# Patient Record
Sex: MALE
[De-identification: names, ages, dates, MRNs, and addresses within clinical notes are randomized per-mention and may not be internally consistent; named-entity substitution may affect disease eponyms.]

---

## 2018-04-25 ENCOUNTER — HOSPITAL ENCOUNTER (INPATIENT)
Dept: HOSPITAL 5 - ED | Age: 38
LOS: 1 days | Discharge: HOME | DRG: 872 | End: 2018-04-26
Attending: INTERNAL MEDICINE | Admitting: INTERNAL MEDICINE
Payer: COMMERCIAL

## 2018-04-25 DIAGNOSIS — L40.9: ICD-10-CM

## 2018-04-25 DIAGNOSIS — I10: ICD-10-CM

## 2018-04-25 DIAGNOSIS — N26.1: ICD-10-CM

## 2018-04-25 DIAGNOSIS — E87.1: ICD-10-CM

## 2018-04-25 DIAGNOSIS — A41.9: Primary | ICD-10-CM

## 2018-04-25 DIAGNOSIS — Q63.2: ICD-10-CM

## 2018-04-25 DIAGNOSIS — A09: ICD-10-CM

## 2018-04-25 DIAGNOSIS — E86.0: ICD-10-CM

## 2018-04-25 DIAGNOSIS — K29.00: ICD-10-CM

## 2018-04-25 LAB
ALBUMIN SERPL-MCNC: 4.1 G/DL (ref 3.9–5)
ALT SERPL-CCNC: 21 UNITS/L (ref 7–56)
APTT BLD: 33.7 SEC. (ref 24.2–36.6)
BASOPHILS # (AUTO): 0 K/MM3 (ref 0–0.1)
BASOPHILS NFR BLD AUTO: 0.3 % (ref 0–1.8)
BILIRUB UR QL STRIP: (no result)
BLOOD UR QL VISUAL: (no result)
BUN SERPL-MCNC: 14 MG/DL (ref 9–20)
BUN/CREAT SERPL: 14 %
CALCIUM SERPL-MCNC: 8.9 MG/DL (ref 8.4–10.2)
EOSINOPHIL # BLD AUTO: 0 K/MM3 (ref 0–0.4)
EOSINOPHIL NFR BLD AUTO: 0 % (ref 0–4.3)
HCT VFR BLD CALC: 43.5 % (ref 35.5–45.6)
HEMOLYSIS INDEX: 5
HGB BLD-MCNC: 14.9 GM/DL (ref 11.8–15.2)
INR PPP: 1 (ref 0.87–1.13)
LIPASE SERPL-CCNC: 23 UNITS/L (ref 13–60)
LYMPHOCYTES # BLD AUTO: 1.3 K/MM3 (ref 1.2–5.4)
LYMPHOCYTES NFR BLD AUTO: 7.6 % (ref 13.4–35)
MCH RBC QN AUTO: 31 PG (ref 28–32)
MCHC RBC AUTO-ENTMCNC: 34 % (ref 32–34)
MCV RBC AUTO: 91 FL (ref 84–94)
MONOCYTES # (AUTO): 1.7 K/MM3 (ref 0–0.8)
MONOCYTES % (AUTO): 10.4 % (ref 0–7.3)
MUCOUS THREADS #/AREA URNS HPF: (no result) /HPF
PH UR STRIP: 5 [PH] (ref 5–7)
PLATELET # BLD: 188 K/MM3 (ref 140–440)
RBC # BLD AUTO: 4.77 M/MM3 (ref 3.65–5.03)
RBC #/AREA URNS HPF: 4 /HPF (ref 0–6)
UROBILINOGEN UR-MCNC: < 2 MG/DL (ref ?–2)
WBC #/AREA URNS HPF: 3 /HPF (ref 0–6)

## 2018-04-25 PROCEDURE — 96375 TX/PRO/DX INJ NEW DRUG ADDON: CPT

## 2018-04-25 PROCEDURE — 85007 BL SMEAR W/DIFF WBC COUNT: CPT

## 2018-04-25 PROCEDURE — 85025 COMPLETE CBC W/AUTO DIFF WBC: CPT

## 2018-04-25 PROCEDURE — 80048 BASIC METABOLIC PNL TOTAL CA: CPT

## 2018-04-25 PROCEDURE — 71045 X-RAY EXAM CHEST 1 VIEW: CPT

## 2018-04-25 PROCEDURE — 87086 URINE CULTURE/COLONY COUNT: CPT

## 2018-04-25 PROCEDURE — 85610 PROTHROMBIN TIME: CPT

## 2018-04-25 PROCEDURE — 87040 BLOOD CULTURE FOR BACTERIA: CPT

## 2018-04-25 PROCEDURE — 96361 HYDRATE IV INFUSION ADD-ON: CPT

## 2018-04-25 PROCEDURE — 81001 URINALYSIS AUTO W/SCOPE: CPT

## 2018-04-25 PROCEDURE — 83690 ASSAY OF LIPASE: CPT

## 2018-04-25 PROCEDURE — 80053 COMPREHEN METABOLIC PANEL: CPT

## 2018-04-25 PROCEDURE — 87045 FECES CULTURE AEROBIC BACT: CPT

## 2018-04-25 PROCEDURE — 74177 CT ABD & PELVIS W/CONTRAST: CPT

## 2018-04-25 PROCEDURE — 82140 ASSAY OF AMMONIA: CPT

## 2018-04-25 PROCEDURE — 96374 THER/PROPH/DIAG INJ IV PUSH: CPT

## 2018-04-25 PROCEDURE — 36415 COLL VENOUS BLD VENIPUNCTURE: CPT

## 2018-04-25 PROCEDURE — 85730 THROMBOPLASTIN TIME PARTIAL: CPT

## 2018-04-25 PROCEDURE — 87324 CLOSTRIDIUM AG IA: CPT

## 2018-04-25 RX ADMIN — Medication SCH ML: at 21:38

## 2018-04-25 RX ADMIN — ONDANSETRON PRN MG: 2 INJECTION INTRAMUSCULAR; INTRAVENOUS at 15:21

## 2018-04-25 RX ADMIN — ONDANSETRON PRN MG: 2 INJECTION INTRAMUSCULAR; INTRAVENOUS at 23:41

## 2018-04-25 NOTE — CAT SCAN REPORT
CT ABDOMEN PELVIS WITH CONTRAST:



HISTORY:  Fever, abdominal discomfort, diarrhea.



COMPARISON: none.



TECHNIQUE:  Helical CT in 1.25mm intervals following IV contrast. 

Sagittal and coronal reconstructions. 





FINDINGS:



Lung bases: Normal.



Liver: Normal.



Biliary system: Normal.



Pancreas: Normal.



Spleen: Normal.



Kidneys/ureters/bladder: The right kidney is normal. The left kidney is 

atrophic and ectopic in location residing in the superior left pelvis. 

Normal bladder.



Adrenal glands: Normal.



Aorta: Normal.



Intestines: Unremarkable given no oral contrast was administered.



Appendix: Normal.



Pelvic viscera: Normal.



Ascites: None.



Adenopathy: None.



Musculoskeletal: Moderate degenerative changes in the thoracolumbar 

spine.





IMPRESSION:

No acute inflammatory process is identified.

Left renal ectopia and atrophy.

## 2018-04-25 NOTE — XRAY REPORT
AP CHEST:



HISTORY: Hypertension



No comparison. There is an ill-defined opacity in the lingula which 

could represent an early infiltrate, scarring or atelectasis. The right 

lung is clear. Heart and mediastinal structures are unremarkable.



IMPRESSION:

Lingular opacity as described.

## 2018-04-25 NOTE — HISTORY AND PHYSICAL REPORT
<DEREK FUNG - Last Filed: 04/25/18 16:51>





History of Present Illness


Date of examination: 04/25/18


Date of admission: 


04/25/18 09:49





Chief complaint: 





Fever and vomiting


History of present illness: 





Patient is a 38-year-old male with past medical history of hypertension and 

psoriasis who presented to the emergency department with complaints of fever 

and vomiting for the past 3 days.  He has not been able to eat and states that 

he vomits every time he drinks anything.  He has vomited once thus far today. 

He denies nausea and abdominal pain. Patient was febrile when he presented to ED





Past History


Past Medical History: hypertension, other (psoriasis)


Social history: denies: smoking





Medications and Allergies


 Allergies











Allergy/AdvReac Type Severity Reaction Status Date / Time


 


No Known Allergies Allergy   Verified 04/25/18 08:20











 Home Medications











 Medication  Instructions  Recorded  Confirmed  Last Taken  Type


 


No Known Home Medications [No  04/25/18 04/25/18 Unknown History





Reported Home Medications]     











Active Meds: 


Active Medications





Acetaminophen (Tylenol)  650 mg PO Q4H PRN


   PRN Reason: Pain MILD(1-3)/Fever >100.5/HA


Bisacodyl (Dulcolax)  10 mg [place] QDAY PRN


   PRN Reason: Constipation unrelieved by MOM


Ondansetron HCl (Zofran)  4 mg IV Q8H PRN


   PRN Reason: Nausea And Vomiting


Oxycodone/Acetaminophen (Percocet 5/325)  1 tab PO Q6H PRN


   PRN Reason: Pain, Moderate (4-6)


Sodium Chloride (Sodium Chloride Flush Syringe 10 Ml)  10 ml IV PRN PRN


   PRN Reason: LINE FLUSH


Sodium Chloride (Sodium Chloride Flush Syringe 10 Ml)  10 ml IV BID HAZEL


Zolpidem Tartrate (Ambien)  5 mg PO QHS PRN


   PRN Reason: Insomnia











Review of Systems


Constitutional: fever, no weight gain, no weakness, no lethargy


Ears, nose, mouth and throat: no tinnitis, no nose pain, no nasal discharge


Cardiovascular: no palpitations, no syncope, no shortness of breath


Respiratory: no cough with sputum, no hemoptysis, no dyspnea on exertion


Gastrointestinal: no diarrhea, no change in bowel habits, no hematemesis


Genitourinary Male: no flank pain, no urinary frequency, no incontinence


Rectal: no incontinence, no bleeding


Musculoskeletal: no shooting arm pain, no shooting leg pain


Integumentary: no rash, no redness, no bullae





Exam





- Constitutional


Vitals: 


 











Temp Pulse Resp BP Pulse Ox


 


 98.9 F   60   20   125/68   98 


 


 04/25/18 11:15  04/25/18 11:15  04/25/18 11:15  04/25/18 11:15  04/25/18 11:15











General appearance: Present: no acute distress, well-nourished





- EENT


Eyes: Present: PERRL


ENT: hearing intact, clear oral mucosa





- Neck


Neck: Present: supple, normal ROM





- Respiratory


Respiratory effort: normal


Respiratory: bilateral: CTA





- Cardiovascular


Heart Sounds: Present: S1 & S2.  Absent: rub, click





- Extremities


Extremities: pulses symmetrical, No edema


Peripheral Pulses: within normal limits





- Abdominal


General gastrointestinal: Present: soft, non-tender, non-distended, normal 

bowel sounds





- Integumentary


Integumentary: Present: clear, warm, dry





- Musculoskeletal


Musculoskeletal: gait normal, strength equal bilaterally





- Psychiatric


Psychiatric: appropriate mood/affect, intact judgment & insight





- Neurologic


Neurologic: CNII-XII intact, moves all extremities





Results





- Labs


CBC & Chem 7: 


 04/25/18 07:12





 04/25/18 07:12


Labs: 


 Laboratory Last Values











WBC  16.4 K/mm3 (4.5-11.0)  H  04/25/18  07:12    


 


RBC  4.77 M/mm3 (3.65-5.03)   04/25/18  07:12    


 


Hgb  14.9 gm/dl (11.8-15.2)   04/25/18  07:12    


 


Hct  43.5 % (35.5-45.6)   04/25/18  07:12    


 


MCV  91 fl (84-94)   04/25/18  07:12    


 


MCH  31 pg (28-32)   04/25/18  07:12    


 


MCHC  34 % (32-34)   04/25/18  07:12    


 


RDW  14.1 % (13.2-15.2)   04/25/18  07:12    


 


Plt Count  188 K/mm3 (140-440)   04/25/18  07:12    


 


Lymph % (Auto)  7.6 % (13.4-35.0)  L  04/25/18  07:12    


 


Mono % (Auto)  10.4 % (0.0-7.3)  H  04/25/18  07:12    


 


Eos % (Auto)  0.0 % (0.0-4.3)   04/25/18  07:12    


 


Baso % (Auto)  0.3 % (0.0-1.8)   04/25/18  07:12    


 


Lymph #  1.3 K/mm3 (1.2-5.4)   04/25/18  07:12    


 


Mono #  1.7 K/mm3 (0.0-0.8)  H  04/25/18  07:12    


 


Eos #  0.0 K/mm3 (0.0-0.4)   04/25/18  07:12    


 


Baso #  0.0 K/mm3 (0.0-0.1)   04/25/18  07:12    


 


Seg Neutrophils %  81.7 % (40.0-70.0)  H  04/25/18  07:12    


 


Seg Neutrophils #  13.4 K/mm3 (1.8-7.7)  H  04/25/18  07:12    


 


PT  13.7 Sec. (12.2-14.9)   04/25/18  07:12    


 


INR  1.00  (0.87-1.13)   04/25/18  07:12    


 


APTT  33.7 Sec. (24.2-36.6)   04/25/18  07:12    


 


Sodium  133 mmol/L (137-145)  L  04/25/18  07:12    


 


Potassium  4.1 mmol/L (3.6-5.0)   04/25/18  07:12    


 


Chloride  92.5 mmol/L ()  L  04/25/18  07:12    


 


Carbon Dioxide  25 mmol/L (22-30)   04/25/18  07:12    


 


Anion Gap  20 mmol/L  04/25/18  07:12    


 


BUN  14 mg/dL (9-20)   04/25/18  07:12    


 


Creatinine  1.0 mg/dL (0.8-1.5)   04/25/18  07:12    


 


Estimated GFR  > 60 ml/min  04/25/18  07:12    


 


BUN/Creatinine Ratio  14 %  04/25/18  07:12    


 


Glucose  121 mg/dL ()  H  04/25/18  07:12    


 


Lactic Acid  1.00 mmol/L (0.7-2.0)   04/25/18  08:34    


 


Calcium  8.9 mg/dL (8.4-10.2)   04/25/18  07:12    


 


Total Bilirubin  1.20 mg/dL (0.1-1.2)   04/25/18  07:12    


 


AST  14 units/L (5-40)   04/25/18  07:12    


 


ALT  21 units/L (7-56)   04/25/18  07:12    


 


Alkaline Phosphatase  55 units/L ()   04/25/18  07:12    


 


Total Protein  7.9 g/dL (6.3-8.2)   04/25/18  07:12    


 


Albumin  4.1 g/dL (3.9-5)   04/25/18  07:12    


 


Albumin/Globulin Ratio  1.1 %  04/25/18  07:12    


 


Lipase  23 units/L (13-60)   04/25/18  07:12    


 


Urine Color  Melba  (Yellow)   04/25/18  07:48    


 


Urine Turbidity  Hazy  (Clear)   04/25/18  07:48    


 


Urine pH  5.0  (5.0-7.0)   04/25/18  07:48    


 


Ur Specific Gravity  1.027  (1.003-1.030)   04/25/18  07:48    


 


Urine Protein  100 mg/dl mg/dL (Negative)   04/25/18  07:48    


 


Urine Glucose (UA)  Neg mg/dL (Negative)   04/25/18  07:48    


 


Urine Ketones  Neg mg/dL (Negative)   04/25/18  07:48    


 


Urine Blood  Mod  (Negative)   04/25/18  07:48    


 


Urine Nitrite  Neg  (Negative)   04/25/18  07:48    


 


Urine Bilirubin  Neg  (Negative)   04/25/18  07:48    


 


Urine Urobilinogen  < 2.0 mg/dL (<2.0)   04/25/18  07:48    


 


Ur Leukocyte Esterase  Neg  (Negative)   04/25/18  07:48    


 


Urine WBC (Auto)  3.0 /HPF (0.0-6.0)   04/25/18  07:48    


 


Urine RBC (Auto)  4.0 /HPF (0.0-6.0)   04/25/18  07:48    


 


U Epithel Cells (Auto)  1.0 /HPF (0-13.0)   04/25/18  07:48    


 


Urine Mucus  2+ /HPF  04/25/18  07:48    














Assessment and Plan


Advance Directives: Yes


VTE prophylaxis?: Mechanical


Plan of care discussed with patient/family: Yes





- Patient Problems


(1) Acute gastritis


Current Visit: Yes   Status: Acute   


Plan to address problem: 


IVF, IV abx, Protonix, bowel rest








(2) SIRS (systemic inflammatory response syndrome)


Current Visit: Yes   Status: Acute   


Plan to address problem: 


Likely secondary to above, will continue IVF, IV antibiotics








(3) Hypertension


Current Visit: Yes   Status: Acute   


Plan to address problem: 


Will continue at home antihypertensives








(4) Hyponatremia


Current Visit: Yes   Status: Acute   


Plan to address problem: 


Mild, likely secondary to persistent vomiting, will be corrected with IV fluids








(5) DVT prophylaxis


Current Visit: Yes   Status: Acute   


Plan to address problem: 


SCDs 








<MATTEO NELSON S - Last Filed: 04/25/18 22:19>





History of Present Illness


Date of admission: 


04/25/18 09:49








Medications and Allergies


Active Meds: 


Active Medications





Acetaminophen (Tylenol)  650 mg PO Q4H PRN


   PRN Reason: Pain MILD(1-3)/Fever >100.5/HA


   Last Admin: 04/25/18 16:43 Dose:  650 mg


Bisacodyl (Dulcolax)  10 mg [place] QDAY PRN


   PRN Reason: Constipation unrelieved by MOM


Levofloxacin/Dextrose (Levaquin 500mg/100ml)  500 mg in 100 mls @ 100 mls/hr IV 

Q24HR HAZEL; Protocol


Sodium Chloride (Nacl 0.9% 1000 Ml)  1,000 mls @ 100 mls/hr IV AS DIRECT HAZEL


Ondansetron HCl (Zofran)  4 mg IV Q8H PRN


   PRN Reason: Nausea And Vomiting


   Last Admin: 04/25/18 15:21 Dose:  4 mg


Oxycodone/Acetaminophen (Percocet 5/325)  1 tab PO Q6H PRN


   PRN Reason: Pain, Moderate (4-6)


Sodium Chloride (Sodium Chloride Flush Syringe 10 Ml)  10 ml IV PRN PRN


   PRN Reason: LINE FLUSH


Sodium Chloride (Sodium Chloride Flush Syringe 10 Ml)  10 ml IV BID HAZEL


   Last Admin: 04/25/18 21:38 Dose:  10 ml


Zolpidem Tartrate (Ambien)  5 mg PO QHS PRN


   PRN Reason: Insomnia











Exam





- Constitutional


Vitals: 


 











Temp Pulse Resp BP Pulse Ox


 


 99.0 F   65   19   129/71   98 


 


 04/25/18 21:36  04/25/18 21:36  04/25/18 21:36  04/25/18 21:36  04/25/18 21:36














Results





- Labs


CBC & Chem 7: 


 04/25/18 07:12





 04/25/18 07:12


Labs: 


 Laboratory Last Values











WBC  16.4 K/mm3 (4.5-11.0)  H  04/25/18  07:12    


 


RBC  4.77 M/mm3 (3.65-5.03)   04/25/18  07:12    


 


Hgb  14.9 gm/dl (11.8-15.2)   04/25/18  07:12    


 


Hct  43.5 % (35.5-45.6)   04/25/18  07:12    


 


MCV  91 fl (84-94)   04/25/18  07:12    


 


MCH  31 pg (28-32)   04/25/18  07:12    


 


MCHC  34 % (32-34)   04/25/18  07:12    


 


RDW  14.1 % (13.2-15.2)   04/25/18  07:12    


 


Plt Count  188 K/mm3 (140-440)   04/25/18  07:12    


 


Lymph % (Auto)  7.6 % (13.4-35.0)  L  04/25/18  07:12    


 


Mono % (Auto)  10.4 % (0.0-7.3)  H  04/25/18  07:12    


 


Eos % (Auto)  0.0 % (0.0-4.3)   04/25/18  07:12    


 


Baso % (Auto)  0.3 % (0.0-1.8)   04/25/18  07:12    


 


Lymph #  1.3 K/mm3 (1.2-5.4)   04/25/18  07:12    


 


Mono #  1.7 K/mm3 (0.0-0.8)  H  04/25/18  07:12    


 


Eos #  0.0 K/mm3 (0.0-0.4)   04/25/18  07:12    


 


Baso #  0.0 K/mm3 (0.0-0.1)   04/25/18  07:12    


 


Seg Neutrophils %  81.7 % (40.0-70.0)  H  04/25/18  07:12    


 


Seg Neutrophils #  13.4 K/mm3 (1.8-7.7)  H  04/25/18  07:12    


 


PT  13.7 Sec. (12.2-14.9)   04/25/18  07:12    


 


INR  1.00  (0.87-1.13)   04/25/18  07:12    


 


APTT  33.7 Sec. (24.2-36.6)   04/25/18  07:12    


 


Sodium  133 mmol/L (137-145)  L  04/25/18  07:12    


 


Potassium  4.1 mmol/L (3.6-5.0)   04/25/18  07:12    


 


Chloride  92.5 mmol/L ()  L  04/25/18  07:12    


 


Carbon Dioxide  25 mmol/L (22-30)   04/25/18  07:12    


 


Anion Gap  20 mmol/L  04/25/18  07:12    


 


BUN  14 mg/dL (9-20)   04/25/18  07:12    


 


Creatinine  1.0 mg/dL (0.8-1.5)   04/25/18  07:12    


 


Estimated GFR  > 60 ml/min  04/25/18  07:12    


 


BUN/Creatinine Ratio  14 %  04/25/18  07:12    


 


Glucose  121 mg/dL ()  H  04/25/18  07:12    


 


Lactic Acid  1.00 mmol/L (0.7-2.0)   04/25/18  08:34    


 


Calcium  8.9 mg/dL (8.4-10.2)   04/25/18  07:12    


 


Total Bilirubin  1.20 mg/dL (0.1-1.2)   04/25/18  07:12    


 


AST  14 units/L (5-40)   04/25/18  07:12    


 


ALT  21 units/L (7-56)   04/25/18  07:12    


 


Alkaline Phosphatase  55 units/L ()   04/25/18  07:12    


 


Total Protein  7.9 g/dL (6.3-8.2)   04/25/18  07:12    


 


Albumin  4.1 g/dL (3.9-5)   04/25/18  07:12    


 


Albumin/Globulin Ratio  1.1 %  04/25/18  07:12    


 


Lipase  23 units/L (13-60)   04/25/18  07:12    


 


Urine Color  Melba  (Yellow)   04/25/18  07:48    


 


Urine Turbidity  Hazy  (Clear)   04/25/18  07:48    


 


Urine pH  5.0  (5.0-7.0)   04/25/18  07:48    


 


Ur Specific Gravity  1.027  (1.003-1.030)   04/25/18  07:48    


 


Urine Protein  100 mg/dl mg/dL (Negative)   04/25/18  07:48    


 


Urine Glucose (UA)  Neg mg/dL (Negative)   04/25/18  07:48    


 


Urine Ketones  Neg mg/dL (Negative)   04/25/18  07:48    


 


Urine Blood  Mod  (Negative)   04/25/18  07:48    


 


Urine Nitrite  Neg  (Negative)   04/25/18  07:48    


 


Urine Bilirubin  Neg  (Negative)   04/25/18  07:48    


 


Urine Urobilinogen  < 2.0 mg/dL (<2.0)   04/25/18  07:48    


 


Ur Leukocyte Esterase  Neg  (Negative)   04/25/18  07:48    


 


Urine WBC (Auto)  3.0 /HPF (0.0-6.0)   04/25/18  07:48    


 


Urine RBC (Auto)  4.0 /HPF (0.0-6.0)   04/25/18  07:48    


 


U Epithel Cells (Auto)  1.0 /HPF (0-13.0)   04/25/18  07:48    


 


Urine Mucus  2+ /HPF  04/25/18  07:48    


 


C. difficile Toxin A&B  Negative  (Negative)   04/25/18  10:02    














Assessment and Plan


Assessment and plan: 


Patient seen and examined


Agree with H/p

## 2018-04-25 NOTE — EMERGENCY DEPARTMENT REPORT
ED General Adult HPI





- General


Chief complaint: Fever


Stated complaint: EXTREME VOMITING


Time Seen by Provider: 04/25/18 08:17


Source: patient


Mode of arrival: Ambulatory


Limitations: No Limitations





- History of Present Illness


Initial comments: 


38-year-old male on Humira for psoriasis.  He states that he has had fever and 

occasional chills over the past 4 days.  He has noted intermittent diarrhea 

continuing through today and occasional vomiting.  He is somewhat nauseated.  

He mentioned abdominal pain in triage but was not complaining of that to me.  

He states that he has been on Humira for a few months.  He's had no prior 

serious infection or hospitalization.





Location: abdomen


Consistency: intermittent


Improves with: none


Worsens with: none


Associated Symptoms: denies other symptoms, nausea/vomiting


Treatments Prior to Arrival: none





- Related Data


 Home Medications











 Medication  Instructions  Recorded  Confirmed  Last Taken


 


No Known Home Medications [No  04/25/18 04/25/18 Unknown





Reported Home Medications]    











 Allergies











Allergy/AdvReac Type Severity Reaction Status Date / Time


 


No Known Allergies Allergy   Verified 04/25/18 08:20














ED Review of Systems


ROS: 


Stated complaint: EXTREME VOMITING


Other details as noted in HPI





Constitutional: denies: chills, fever


Eyes: denies: eye pain, eye discharge, vision change


ENT: denies: ear pain, throat pain


Respiratory: denies: cough, shortness of breath, wheezing


Cardiovascular: denies: chest pain, palpitations


Endocrine: no symptoms reported


Gastrointestinal: denies: abdominal pain, nausea, diarrhea


Genitourinary: denies: urgency, dysuria


Musculoskeletal: denies: back pain, joint swelling, arthralgia


Skin: denies: rash, lesions


Neurological: denies: headache, weakness, paresthesias


Psychiatric: denies: anxiety, depression


Hematological/Lymphatic: denies: easy bleeding, easy bruising





ED Past Medical Hx





- Past Medical History


Previous Medical History?: Yes


Hx Hypertension: Yes


Additional medical history: Hiatal Hernia





- Surgical History


Past Surgical History?: No





- Social History


Smoking Status: Never Smoker


Substance Use Type: None





- Medications


Home Medications: 


 Home Medications











 Medication  Instructions  Recorded  Confirmed  Last Taken  Type


 


No Known Home Medications [No  04/25/18 04/25/18 Unknown History





Reported Home Medications]     














ED Physical Exam





- General


Limitations: No Limitations


General appearance: alert, in no apparent distress, other (somewhat sweaty)





- Head


Head exam: Present: atraumatic, normocephalic





- Eye


Eye exam: Present: normal appearance.  Absent: scleral icterus





- ENT


ENT exam: Present: mucous membranes dry





- Neck


Neck exam: Present: normal inspection.  Absent: tenderness, meningismus





- Respiratory


Respiratory exam: Present: normal lung sounds bilaterally.  Absent: respiratory 

distress





- Cardiovascular


Cardiovascular Exam: Present: regular rate, normal rhythm.  Absent: systolic 

murmur, diastolic murmur, rubs, gallop





- GI/Abdominal


GI/Abdominal exam: Present: soft, normal bowel sounds.  Absent: distended, 

tenderness, guarding, rebound, rigid





- Rectal


Rectal exam: Present: deferred





- Extremities Exam


Extremities exam: Present: normal inspection





- Back Exam


Back exam: Present: normal inspection





- Neurological Exam


Neurological exam: Present: alert, oriented X3, CN II-XII intact.  Absent: 

motor sensory deficit





- Psychiatric


Psychiatric exam: Present: normal affect, normal mood





- Skin


Skin exam: Present: warm, dry, intact, normal color.  Absent: rash





ED Course





 Vital Signs











  04/25/18 04/25/18 04/25/18





  06:55 08:13 09:44


 


Temperature 103 F H 99.2 F 


 


Pulse Rate 84 72 67


 


Respiratory 18 18 18





Rate   


 


Blood Pressure 151/76  


 


Blood Pressure  145/87 137/71





[Right]   


 


O2 Sat by Pulse 97 95 100





Oximetry   














  04/25/18





  11:15


 


Temperature 98.9 F


 


Pulse Rate 60


 


Respiratory 20





Rate 


 


Blood Pressure 125/68


 


Blood Pressure 





[Right] 


 


O2 Sat by Pulse 98





Oximetry 














- Reevaluation(s)


Reevaluation #1: 


Given IV fluid and 1 dose of Levaquin.  C. difficile studies sent.  Patient 

admitted to the hospitalist service in stable condition.


04/25/18 11:43








ED Medical Decision Making





- Lab Data


Result diagrams: 


 04/25/18 07:12





 04/25/18 07:12








 Laboratory Results - last 24 hr











  04/25/18 04/25/18 04/25/18





  07:12 07:12 07:12


 


WBC  16.4 H  


 


RBC  4.77  


 


Hgb  14.9  


 


Hct  43.5  


 


MCV  91  


 


MCH  31  


 


MCHC  34  


 


RDW  14.1  


 


Plt Count  188  


 


Lymph % (Auto)  7.6 L  


 


Mono % (Auto)  10.4 H  


 


Eos % (Auto)  0.0  


 


Baso % (Auto)  0.3  


 


Lymph #  1.3  


 


Mono #  1.7 H  


 


Eos #  0.0  


 


Baso #  0.0  


 


Seg Neutrophils %  81.7 H  


 


Seg Neutrophils #  13.4 H  


 


PT    13.7


 


INR    1.00


 


APTT    33.7


 


Sodium   133 L 


 


Potassium   4.1 


 


Chloride   92.5 L 


 


Carbon Dioxide   25 


 


Anion Gap   20 


 


BUN   14 


 


Creatinine   1.0 


 


Estimated GFR   > 60 


 


BUN/Creatinine Ratio   14 


 


Glucose   121 H 


 


Lactic Acid   


 


Calcium   8.9 


 


Total Bilirubin   1.20 


 


AST   14 


 


ALT   21 


 


Alkaline Phosphatase   55 


 


Total Protein   7.9 


 


Albumin   4.1 


 


Albumin/Globulin Ratio   1.1 


 


Lipase   23 


 


Urine Color   


 


Urine Turbidity   


 


Urine pH   


 


Ur Specific Gravity   


 


Urine Protein   


 


Urine Glucose (UA)   


 


Urine Ketones   


 


Urine Blood   


 


Urine Nitrite   


 


Urine Bilirubin   


 


Urine Urobilinogen   


 


Ur Leukocyte Esterase   


 


Urine WBC (Auto)   


 


Urine RBC (Auto)   


 


U Epithel Cells (Auto)   


 


Urine Mucus   














  04/25/18 04/25/18





  07:48 08:34


 


WBC  


 


RBC  


 


Hgb  


 


Hct  


 


MCV  


 


MCH  


 


MCHC  


 


RDW  


 


Plt Count  


 


Lymph % (Auto)  


 


Mono % (Auto)  


 


Eos % (Auto)  


 


Baso % (Auto)  


 


Lymph #  


 


Mono #  


 


Eos #  


 


Baso #  


 


Seg Neutrophils %  


 


Seg Neutrophils #  


 


PT  


 


INR  


 


APTT  


 


Sodium  


 


Potassium  


 


Chloride  


 


Carbon Dioxide  


 


Anion Gap  


 


BUN  


 


Creatinine  


 


Estimated GFR  


 


BUN/Creatinine Ratio  


 


Glucose  


 


Lactic Acid   1.00


 


Calcium  


 


Total Bilirubin  


 


AST  


 


ALT  


 


Alkaline Phosphatase  


 


Total Protein  


 


Albumin  


 


Albumin/Globulin Ratio  


 


Lipase  


 


Urine Color  Melba 


 


Urine Turbidity  Hazy 


 


Urine pH  5.0 


 


Ur Specific Gravity  1.027 


 


Urine Protein  100 mg/dl 


 


Urine Glucose (UA)  Neg 


 


Urine Ketones  Neg 


 


Urine Blood  Mod 


 


Urine Nitrite  Neg 


 


Urine Bilirubin  Neg 


 


Urine Urobilinogen  < 2.0 


 


Ur Leukocyte Esterase  Neg 


 


Urine WBC (Auto)  3.0 


 


Urine RBC (Auto)  4.0 


 


U Epithel Cells (Auto)  1.0 


 


Urine Mucus  2+ 











Critical care attestation.: 


If time is entered above; I have spent that time in minutes in the direct care 

of this critically ill patient, excluding procedure time.








ED Disposition


Clinical Impression: 


 Febrile illness, acute, H/O immunosuppressive therapy, Enteritis, Hyponatremia

, Volume depletion





Disposition: DC-[id number] OP ADMIT IP TO THIS HOSP


Is pt being admited?: Yes


Does the pt Need Aspirin: No


Condition: Stable


Referrals: 


PRIMARY CARE,MD [Primary Care Provider] - 7 Days


Time of Disposition: 11:43

## 2018-04-26 VITALS — DIASTOLIC BLOOD PRESSURE: 79 MMHG | SYSTOLIC BLOOD PRESSURE: 134 MMHG

## 2018-04-26 LAB
BAND NEUTROPHILS # (MANUAL): 1.1 K/MM3
BUN SERPL-MCNC: 16 MG/DL (ref 9–20)
BUN/CREAT SERPL: 18 %
CALCIUM SERPL-MCNC: 8.6 MG/DL (ref 8.4–10.2)
HCT VFR BLD CALC: 40.7 % (ref 35.5–45.6)
HEMOLYSIS INDEX: 17
HGB BLD-MCNC: 13.7 GM/DL (ref 11.8–15.2)
MCH RBC QN AUTO: 31 PG (ref 28–32)
MCHC RBC AUTO-ENTMCNC: 34 % (ref 32–34)
MCV RBC AUTO: 92 FL (ref 84–94)
MYELOCYTES # (MANUAL): 0 K/MM3
PLATELET # BLD: 188 K/MM3 (ref 140–440)
PROMYELOCYTES # (MANUAL): 0 K/MM3
RBC # BLD AUTO: 4.43 M/MM3 (ref 3.65–5.03)
TOTAL CELLS COUNTED BLD: 100

## 2018-04-26 RX ADMIN — Medication SCH: at 09:38

## 2018-04-26 NOTE — PROGRESS NOTE
Assessment and Plan


Assessment and plan: 








CT abd/pelvis w/ IMPRESSION: No acute inflammatory process is identified. Left 

renal ectopia and atrophy. 








Hospitalist Physical





- Constitutional


Vitals: 


 











Temp Pulse Resp BP Pulse Ox


 


 99.0 F   65   18   129/71   98 


 


 04/25/18 21:36  04/25/18 21:36  04/25/18 22:00  04/25/18 21:36  04/25/18 21:36











General appearance: Present: no acute distress, well-nourished





Results





- Labs


CBC & Chem 7: 


 04/26/18 06:02





 04/26/18 06:02


Labs: 


 Laboratory Last Values











WBC  10.0 K/mm3 (4.5-11.0)   04/26/18  06:02    


 


RBC  4.43 M/mm3 (3.65-5.03)   04/26/18  06:02    


 


Hgb  13.7 gm/dl (11.8-15.2)   04/26/18  06:02    


 


Hct  40.7 % (35.5-45.6)   04/26/18  06:02    


 


MCV  92 fl (84-94)   04/26/18  06:02    


 


MCH  31 pg (28-32)   04/26/18  06:02    


 


MCHC  34 % (32-34)   04/26/18  06:02    


 


RDW  14.4 % (13.2-15.2)   04/26/18  06:02    


 


Plt Count  188 K/mm3 (140-440)   04/26/18  06:02    


 


Lymph % (Auto)  7.6 % (13.4-35.0)  L  04/25/18  07:12    


 


Mono % (Auto)  10.4 % (0.0-7.3)  H  04/25/18  07:12    


 


Eos % (Auto)  0.0 % (0.0-4.3)   04/25/18  07:12    


 


Baso % (Auto)  0.3 % (0.0-1.8)   04/25/18  07:12    


 


Lymph #  1.3 K/mm3 (1.2-5.4)   04/25/18  07:12    


 


Mono #  1.7 K/mm3 (0.0-0.8)  H  04/25/18  07:12    


 


Eos #  0.0 K/mm3 (0.0-0.4)   04/25/18  07:12    


 


Baso #  0.0 K/mm3 (0.0-0.1)   04/25/18  07:12    


 


Seg Neutrophils %  81.7 % (40.0-70.0)  H  04/25/18  07:12    


 


Seg Neutrophils #  13.4 K/mm3 (1.8-7.7)  H  04/25/18  07:12    


 


PT  13.7 Sec. (12.2-14.9)   04/25/18  07:12    


 


INR  1.00  (0.87-1.13)   04/25/18  07:12    


 


APTT  33.7 Sec. (24.2-36.6)   04/25/18  07:12    


 


Sodium  135 mmol/L (137-145)  L  04/26/18  06:02    


 


Potassium  4.1 mmol/L (3.6-5.0)   04/26/18  06:02    


 


Chloride  97.6 mmol/L ()  L  04/26/18  06:02    


 


Carbon Dioxide  26 mmol/L (22-30)   04/26/18  06:02    


 


Anion Gap  16 mmol/L  04/26/18  06:02    


 


BUN  16 mg/dL (9-20)   04/26/18  06:02    


 


Creatinine  0.9 mg/dL (0.8-1.5)   04/26/18  06:02    


 


Estimated GFR  > 60 ml/min  04/26/18  06:02    


 


BUN/Creatinine Ratio  18 %  04/26/18  06:02    


 


Glucose  90 mg/dL ()   04/26/18  06:02    


 


Lactic Acid  1.00 mmol/L (0.7-2.0)   04/25/18  08:34    


 


Calcium  8.6 mg/dL (8.4-10.2)   04/26/18  06:02    


 


Total Bilirubin  1.20 mg/dL (0.1-1.2)   04/25/18  07:12    


 


AST  14 units/L (5-40)   04/25/18  07:12    


 


ALT  21 units/L (7-56)   04/25/18  07:12    


 


Alkaline Phosphatase  55 units/L ()   04/25/18  07:12    


 


Total Protein  7.9 g/dL (6.3-8.2)   04/25/18  07:12    


 


Albumin  4.1 g/dL (3.9-5)   04/25/18  07:12    


 


Albumin/Globulin Ratio  1.1 %  04/25/18  07:12    


 


Lipase  23 units/L (13-60)   04/25/18  07:12    


 


Urine Color  Melba  (Yellow)   04/25/18  07:48    


 


Urine Turbidity  Hazy  (Clear)   04/25/18  07:48    


 


Urine pH  5.0  (5.0-7.0)   04/25/18  07:48    


 


Ur Specific Gravity  1.027  (1.003-1.030)   04/25/18  07:48    


 


Urine Protein  100 mg/dl mg/dL (Negative)   04/25/18  07:48    


 


Urine Glucose (UA)  Neg mg/dL (Negative)   04/25/18  07:48    


 


Urine Ketones  Neg mg/dL (Negative)   04/25/18  07:48    


 


Urine Blood  Mod  (Negative)   04/25/18  07:48    


 


Urine Nitrite  Neg  (Negative)   04/25/18  07:48    


 


Urine Bilirubin  Neg  (Negative)   04/25/18  07:48    


 


Urine Urobilinogen  < 2.0 mg/dL (<2.0)   04/25/18  07:48    


 


Ur Leukocyte Esterase  Neg  (Negative)   04/25/18  07:48    


 


Urine WBC (Auto)  3.0 /HPF (0.0-6.0)   04/25/18  07:48    


 


Urine RBC (Auto)  4.0 /HPF (0.0-6.0)   04/25/18  07:48    


 


U Epithel Cells (Auto)  1.0 /HPF (0-13.0)   04/25/18  07:48    


 


Urine Mucus  2+ /HPF  04/25/18  07:48    


 


C. difficile Toxin A&B  Negative  (Negative)   04/25/18  10:02

## 2018-04-26 NOTE — DISCHARGE SUMMARY
Providers





- Providers


Date of Admission: 


04/25/18 09:49





Date of discharge: 04/26/18


Attending physician: 


EFRAIN LEOS





Primary care physician: 


PRIMARY CARE MD








Hospitalization


Condition: Stable


Hospital course: 


Patient is a 39 yo man with a history of hypertension who presents with n/v/d. 

He was found to have a fever. 





CT abd/pelvis w/ IMPRESSION: No acute inflammatory process is identified. Left 

renal ectopia and atrophy.





Sepsis due to infectious diarrhea most likely, responding to Levaquin. 


AGE


Hyponatremia, dehydration


Hypertension





Patient refuses to stay pass today





Disposition: DC-01 TO HOME OR SELFCARE


Time spent for discharge: 32 min





Core Measure Documentation





- Palliative Care


Palliative Care/ Comfort Measures: Not Applicable





- Core Measures


Any of the following diagnoses?: none





- VTE Discharge Requirements


Deep Vein Thrombosis/Pulmonary Embolism Present on Admission: No


Has pt received <5 days of overlap therapy or INR<2.0: No


Anticoagulant overlap therapy prescribed at discharge: No


Contraindication No Overlap Therapy order at DC: Medical Contraindication





Exam





- Physical Exam


Narrative exam: 


GEN: WDWN, NAD, Awake, Alert, Orientated 


HEENT: NCAT, EOMI, PERRL, OP Clear 


NECK: supple, no adenopathy, no thyromegaly, no JVD 


CVS/HEART: RRR, normal S1S2, pulses present bilaterally 


CHEST/LUNGS: CTA B, Symmetrical chest expansion, good air entry bilaterally 


GI/Abdomen: soft, NTND, good bowel sounds, no guarding or rebound 


/Bladder: no suprapubic tenderness, no CVA or paraspinal tenderness 


EXT/Skin: no c/c/e, no obvious rash 


MSK: FROM x 4 


Neuro: CN 2-12 grossly intact, no new focal deficits 


Psych: calm








- Constitutional


Vitals: 


 











Temp Pulse Resp BP Pulse Ox


 


 98.9 F   62   24   134/79   96 


 


 04/26/18 07:55  04/26/18 07:55  04/26/18 07:55  04/26/18 07:55  04/26/18 07:55














Plan


Activity: other (no strenous activity until cleared by pcp)


Diet: low salt, advance as tolerated


Follow up with: 


PRIMARY CARE,MD [Primary Care Provider] - 7 Days


Prescriptions: 


Ciprofloxacin HCl [Ciprofloxacin TAB] 500 mg PO BID #12 tablet